# Patient Record
(demographics unavailable — no encounter records)

---

## 2024-10-07 NOTE — DATA REVIEWED
[Left] : left [Knee] : knee [FreeTextEntry1] : 2 standing views of the left knee were obtained in the office today and show: Well-preserved medial and lateral compartments.  No fracture or dislocation.  No soft tissue calcification or bone masses.

## 2024-10-07 NOTE — PROCEDURE
[Large Joint Injection] : Large joint injection [Left] : of the left [Knee] : knee [Alcohol] : alcohol [___ cc    1%] : Lidocaine ~Vcc of 1%  [___ cc    4mg] : Dexamethasone (Decadron) ~Vcc of 4 mg  [Risks, benefits, alternatives discussed / Verbal consent obtained] : the risks benefits, and alternatives have been discussed, and verbal consent was obtained [All ultrasound images have been permanently captured and stored accordingly in our picture archiving and communication system] : All ultrasound images have been permanently captured and stored accordingly in our picture archiving and communication system [Visualization of the needle and placement of injection was performed without complication] : visualization of the needle and placement of injection was performed without complication

## 2024-10-07 NOTE — DISCUSSION/SUMMARY
[de-identified] : I reviewed the x-ray findings with the patient.  Today I recommend a cortisone injection for the left knee.  The patient agreed.  The left knee was injected with 2 cc lidocaine, 1 cc dexamethasone.  Procedure note generated.  I recommend an MRI of the left knee to evaluate for a medial meniscus tear.  She has an effusion on exam, medial and lateral joint line tenderness to palpation, positive Misael's with x-rays that show well-preserved medial and lateral compartments.  She will call me 3 to 4 days after the MRI is completed so we can discuss the results.  I recommend she returns to formal physical therapy, Rx provided for that.  I will see her back in 2 months for further evaluation.

## 2024-10-07 NOTE — HISTORY OF PRESENT ILLNESS
[de-identified] : The patient is a 59-year-old female here for an evaluation of her left knee.  She is accompanied by her daughter who is translating for her.  She is having pain in her left knee for the past few months.  The severity increased 2 weeks ago.  She was seen in the hospital for this.  She has had diclofenac and ibuprofen with no relief.  She was sent for formal physical therapy by her primary care provider after being treated for this sometime last year.  The therapy made her knee worse.  She has pain going up and down stairs.  She reports swelling.  She reports buckling.

## 2024-10-07 NOTE — IMAGING
[de-identified] : Physical exam of the left knee: Mild effusion.  No erythema or ecchymosis.  Full extension, flexion to 100 degrees with pain.  Significant tenderness to palpation over the medial and lateral joint lines.  No tenderness to palpation over the collateral ligaments or the anterior aspect of the knee.  Significantly positive Misael's medially.  Intact to light touch, mild antalgic gait.

## 2024-12-03 NOTE — DISCUSSION/SUMMARY
[de-identified] : Today we discussed a treatment plan.  I explained to her that she has 2 problems, the meniscal tear and the bony edema of the medial tibial plateau.  I did explain that although a knee arthroscopy would benefit the meniscus, it could make the bony edema worse and she may need a knee replacement.  I recommended formal physical therapy, she is adamant about doing it at home so she was given a printout from [facility] for a knee conditioning program.  She was also given prescription for a double hinged brace.  She can follow-up in 3 to 4 weeks for further evaluation with Dr. Pena.  Her children were translating for her today.

## 2024-12-03 NOTE — IMAGING
[de-identified] : Physical exam of the left knee: No effusion, no erythema or ecchymosis.  Good range of motion with flexion extension of the left knee.  Significant tenderness to palpation over the medial joint line, some tenderness to palpation over the medial tibial plateau.  No tenderness to palpation over the lateral joint line.  Negative Misael's testing.  Intact to light touch, mild antalgic gait.

## 2024-12-03 NOTE — HISTORY OF PRESENT ILLNESS
[de-identified] : The patient is a 59-year-old female accompanied by her children here for a reevaluation of her left knee.  MRI did show a medial meniscus tear with bony edema of the medial tibial plateau with a questionable subchondral insufficiency fracture of the medial tibial plateau.  She is still having pain in the knee.  She has pain going up and down stairs.  She did formal physical therapy last year which did not help her.  I recommended returning to formal physical therapy after our last office visit here, she did not.  She did have a cortisone injection for the left knee at her last office visit which did not help her.  She takes oral anti-inflammatory medication at home as needed but cannot remember the name of it.  She is not happy with the way her knee feels.

## 2025-03-26 NOTE — HISTORY OF PRESENT ILLNESS
[de-identified] : The patient is a 60-year-old female, accompanied by her daughter, here for a subsequent reevaluation of her left knee.  She is status post left knee arthroscopy, chondroplasty on 3/18/2025.  She had a healed medial meniscus tear.  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  This is her initial postoperative visit.

## 2025-03-26 NOTE — IMAGING
[de-identified] : Physical exam of the left knee: Trace effusion.  No erythema or ecchymosis.  The surgical portals are healing well.  Clean, dry, intact.  No surrounding erythema or drainage noted.  Full extension, flexion to 90 degrees.  No pain with calf compression, negative Homans' sign.  Intact to light touch.

## 2025-03-26 NOTE — DISCUSSION/SUMMARY
[de-identified] : Today the sutures were removed, Steri-Strips were placed.  She can weight-bear as tolerated.  She will start formal physical therapy.  I did explain that she had a healed medial meniscus tear as well as some arthritis in the knee.  I explained to her that she should be taking ibuprofen 800 mg before tramadol and the tramadol was for severe pain only.  I did explain that is not a maintenance medicine.  I explained that we would treat the arthritis with anti-inflammatory medication and/or cortisone injections in the future.  I will see her back in 6 weeks for further evaluation.

## 2025-04-28 NOTE — HISTORY OF PRESENT ILLNESS
[de-identified] : 60-year-old female bilateral wrist pain discomfort.  The right is worse than the left.  It swells on her.  It hurts her to use the arm at times.  Sometimes she gets numbness and tingling in the fingers.  Sometimes with taking anti-inflammatories she really does not wear a brace

## 2025-04-28 NOTE — DATA REVIEWED
[FreeTextEntry1] : Radiographs 3 views of bilateral wrist show no fracture dislocation no destructive lesions.  No significant degenerative change

## 2025-04-28 NOTE — PHYSICAL EXAM
[de-identified] : Patient has swelling about both wrist.  Range of motion to the fingers.  No erythema ecchymoses or abrasions.  There is normal capillary refill.  Patient does have swelling around the wrist.  She has a positive Tinel's median nerve compression test bilaterally.  Good range of motion to the fingers.  No erythema ecchymoses or abrasion

## 2025-04-28 NOTE — ASSESSMENT
[FreeTextEntry1] : Patient has signs consistent with carpal tunnel syndrome bilaterally.  She remains not symptomatic enough at this time to warrant a surgical intervention.  So she will be kept under continued observation.  She will try brace she will try anti-inflammatories or prescribed Mobic for her.  If she does not get better they will call the office back to get an EMG test set up.